# Patient Record
Sex: FEMALE | URBAN - METROPOLITAN AREA
[De-identification: names, ages, dates, MRNs, and addresses within clinical notes are randomized per-mention and may not be internally consistent; named-entity substitution may affect disease eponyms.]

---

## 2017-11-15 ENCOUNTER — APPOINTMENT (RX ONLY)
Dept: URBAN - METROPOLITAN AREA CLINIC 17 | Facility: CLINIC | Age: 47
Setting detail: DERMATOLOGY
End: 2017-11-15

## 2017-11-15 DIAGNOSIS — L71.8 OTHER ROSACEA: ICD-10-CM

## 2017-11-15 PROCEDURE — ? COUNSELING

## 2017-11-15 PROCEDURE — ? PRESCRIPTION

## 2017-11-15 PROCEDURE — 99202 OFFICE O/P NEW SF 15 MIN: CPT

## 2017-11-15 PROCEDURE — ? PRODUCT LINE (PHARMACEUTICALS)

## 2017-11-15 RX ORDER — MINOCYCLINE HYDROCHLORIDE 100 MG/1
CAPSULE ORAL BID
Qty: 60 | Refills: 2 | Status: ERX | COMMUNITY
Start: 2017-11-15

## 2017-11-15 RX ADMIN — MINOCYCLINE HYDROCHLORIDE: 100 CAPSULE ORAL at 23:52

## 2017-11-15 NOTE — PROCEDURE: PRODUCT LINE (PHARMACEUTICALS)
Product 44 Price (In Dollars - Numeric Only, No Special Characters Or $): 0.00
Product 7 Application Directions: Apply affected area QD to BID
Product 19 Units: 0
Name Of Product 12: Imiquimod
Name Of Product 8: Acne Triple Gel
Product 12 Price (In Dollars - Numeric Only, No Special Characters Or $): 50.00
Product 5 Application Directions: Apply to face twice daily with moisturizer
Product 1 Application Directions: Apply affected area QD-BID
Name Of Product 5: Rosacea triple gel
Name Of Product 13: Tazarotene 0.05%
Product 6 Application Directions: Apply to face nightly
Name Of Product 6: Tretinoin 0.1% cream
Name Of Product 11: Tretinoin 0.025%
Name Of Product 7: Seborrheic Dermatitis Cream
Name Of Product 2: Clobetasol Solution
Detail Level: Zone
Name Of Product 10: Actinic keratosis gel
Name Of Product 4: Melasma Emulsion
Render Product Pricing In Note: Yes
Product 13 Application Directions: Apply 1-2 pumps 1 time weekly at night, increase daily as tolerated
Product 3 Application Directions: Wash affected area of scalp once daily
Name Of Product 3: Clobetasol Shampoo
Name Of Product 9: Tretinoin 0.05%
Product 7 Price (In Dollars - Numeric Only, No Special Characters Or $): 40.00
Product 4 Application Directions: Apply to areas of dark pigmentation once to twice daily
Product 12 Application Directions: Apply to affected area once a day x 2 weeks, stop treating for 2 weeks then retreat for 2 weeks
Product 5 Units: 1
Product 9 Application Directions: Apply to face QHS

## 2017-11-27 ENCOUNTER — RX ONLY (OUTPATIENT)
Age: 47
Setting detail: RX ONLY
End: 2017-11-27

## 2017-11-27 RX ORDER — DOXYCYCLINE 100 MG/1
1 TABLET, FILM COATED ORAL BID
Qty: 60 | Refills: 3 | Status: ERX | COMMUNITY
Start: 2017-11-27

## 2017-12-14 ENCOUNTER — RX ONLY (OUTPATIENT)
Age: 47
Setting detail: RX ONLY
End: 2017-12-14

## 2017-12-14 RX ORDER — FLUCONAZOLE 150 MG/1
1 TABLET ORAL
Qty: 2 | Refills: 0 | Status: ERX | COMMUNITY
Start: 2017-12-14

## 2018-01-24 ENCOUNTER — APPOINTMENT (RX ONLY)
Dept: URBAN - METROPOLITAN AREA CLINIC 17 | Facility: CLINIC | Age: 48
Setting detail: DERMATOLOGY
End: 2018-01-24

## 2018-01-24 DIAGNOSIS — L71.8 OTHER ROSACEA: ICD-10-CM

## 2018-01-24 DIAGNOSIS — L50.3 DERMATOGRAPHIC URTICARIA: ICD-10-CM

## 2018-01-24 PROCEDURE — ? PRODUCT LINE (PHARMACEUTICALS)

## 2018-01-24 PROCEDURE — 99213 OFFICE O/P EST LOW 20 MIN: CPT

## 2018-01-24 PROCEDURE — ? OTHER

## 2018-01-24 PROCEDURE — ? PRESCRIPTION

## 2018-01-24 PROCEDURE — ? COUNSELING

## 2018-01-24 PROCEDURE — ? TREATMENT REGIMEN

## 2018-01-24 RX ORDER — SODIUM SULFACETAMIDE AND SULFUR 80; 40 MG/ML; MG/ML
1 LOTION TOPICAL QD
Qty: 1 | Refills: 11 | Status: ERX | COMMUNITY
Start: 2018-01-24

## 2018-01-24 RX ORDER — DOXYCYCLINE HYCLATE 150 MG/1
1 TABLET, DELAYED RELEASE ORAL QD
Qty: 30 | Refills: 11 | Status: ERX | COMMUNITY
Start: 2018-01-24

## 2018-01-24 RX ADMIN — DOXYCYCLINE HYCLATE 1: 150 TABLET, DELAYED RELEASE ORAL at 23:20

## 2018-01-24 RX ADMIN — SODIUM SULFACETAMIDE AND SULFUR 1: 80; 40 LOTION TOPICAL at 23:21

## 2018-01-24 ASSESSMENT — LOCATION DETAILED DESCRIPTION DERM
LOCATION DETAILED: LEFT CLAVICULAR SKIN
LOCATION DETAILED: LEFT INFERIOR CENTRAL MALAR CHEEK

## 2018-01-24 ASSESSMENT — LOCATION SIMPLE DESCRIPTION DERM
LOCATION SIMPLE: LEFT CLAVICULAR SKIN
LOCATION SIMPLE: LEFT CHEEK

## 2018-01-24 ASSESSMENT — LOCATION ZONE DERM
LOCATION ZONE: TRUNK
LOCATION ZONE: FACE

## 2018-01-24 NOTE — PROCEDURE: OTHER
Note Text (......Xxx Chief Complaint.): This diagnosis correlates with the
Detail Level: Simple
Other (Free Text): Will consider laser treatment in the future, but needs to get her Rosacea under good control first

## 2018-01-24 NOTE — PROCEDURE: PRODUCT LINE (PHARMACEUTICALS)
Product 13 Application Directions: Apply 1-2 pumps 1 time weekly at night, increase daily as tolerated
Product 16 Units: 0
Product 24 Price (In Dollars - Numeric Only, No Special Characters Or $): 0.00
Product 5 Application Directions: Apply to face twice daily with moisturizer
Name Of Product 13: Tazarotene 0.05%
Name Of Product 6: Tretinoin 0.1% cream
Name Of Product 2: Clobetasol Solution
Product 7 Application Directions: Apply affected area QD to BID
Product 9 Price (In Dollars - Numeric Only, No Special Characters Or $): 50.00
Name Of Product 12: Imiquimod
Product 12 Application Directions: Apply to affected area once a day x 2 weeks, stop treating for 2 weeks then retreat for 2 weeks
Name Of Product 7: Seborrheic Dermatitis Cream
Send Charges To Patient Encounter: Yes
Product 3 Application Directions: Wash affected area of scalp once daily
Product 7 Price (In Dollars - Numeric Only, No Special Characters Or $): 40.00
Product 1 Application Directions: Apply affected area QD-BID
Name Of Product 5: Rosacea triple gel
Name Of Product 10: Actinic keratosis gel
Product 4 Application Directions: Apply to areas of dark pigmentation once to twice daily
Name Of Product 1: Melasma emulsion
Name Of Product 11: Tretinoin 0.025%
Product 6 Application Directions: Apply to face nightly
Name Of Product 3: Clobetasol Shampoo
Name Of Product 8: Acne Triple Gel
Detail Level: Zone
Product 5 Units: 1
Name Of Product 9: Tretinoin 0.05%
Product 9 Application Directions: Apply to face QHS

## 2018-04-25 ENCOUNTER — APPOINTMENT (RX ONLY)
Dept: URBAN - METROPOLITAN AREA CLINIC 17 | Facility: CLINIC | Age: 48
Setting detail: DERMATOLOGY
End: 2018-04-25

## 2018-04-25 DIAGNOSIS — L71.8 OTHER ROSACEA: ICD-10-CM | Status: WELL CONTROLLED

## 2018-04-25 DIAGNOSIS — Z71.89 OTHER SPECIFIED COUNSELING: ICD-10-CM

## 2018-04-25 PROCEDURE — ? TREATMENT REGIMEN

## 2018-04-25 PROCEDURE — ? COUNSELING

## 2018-04-25 PROCEDURE — 99213 OFFICE O/P EST LOW 20 MIN: CPT

## 2018-04-25 PROCEDURE — ? OTHER

## 2018-04-25 PROCEDURE — ? PRODUCT LINE (PHARMACEUTICALS)

## 2018-04-25 PROCEDURE — ? PRESCRIPTION

## 2018-04-25 RX ORDER — BRIMONIDINE TARTRATE 5 MG/G
GEL TOPICAL PRN
Qty: 1 | Refills: 5 | Status: ERX | COMMUNITY
Start: 2018-04-25

## 2018-04-25 RX ADMIN — BRIMONIDINE TARTRATE: 5 GEL TOPICAL at 22:37

## 2018-04-25 ASSESSMENT — LOCATION DETAILED DESCRIPTION DERM: LOCATION DETAILED: LEFT INFERIOR CENTRAL MALAR CHEEK

## 2018-04-25 ASSESSMENT — LOCATION SIMPLE DESCRIPTION DERM: LOCATION SIMPLE: LEFT CHEEK

## 2018-04-25 ASSESSMENT — LOCATION ZONE DERM: LOCATION ZONE: FACE

## 2018-04-25 NOTE — PROCEDURE: PRODUCT LINE (PHARMACEUTICALS)
Product 6 Price (In Dollars - Numeric Only, No Special Characters Or $): 50.00
Product 9 Application Directions: Apply to face QHS
Product 31 Units: 0
Name Of Product 12: Imiquimod
Product 22 Price (In Dollars - Numeric Only, No Special Characters Or $): 0.00
Name Of Product 11: Tretinoin 0.025%
Name Of Product 10: Actinic keratosis gel
Name Of Product 3: Clobetasol Shampoo
Name Of Product 1: Tacrolimus ointment
Product 5 Units: 1
Product 12 Application Directions: Apply to affected area once a day x 2 weeks, stop treating for 2 weeks then retreat for 2 weeks
Product 2 Application Directions: Apply affected area QD-BID
Product 3 Price (In Dollars - Numeric Only, No Special Characters Or $): 40.00
Product 13 Application Directions: Apply 1-2 pumps 1 time weekly at night, increase daily as tolerated
Detail Level: Zone
Product 1 Application Directions: Apply affected area QHS
Product 3 Application Directions: Wash affected area of scalp once daily
Product 5 Application Directions: Apply to face twice daily with moisturizer
Name Of Product 6: Tretinoin 0.1% cream
Name Of Product 13: Tazarotene 0.05%
Product 8 Application Directions: Apply affected area QD to BID
Name Of Product 4: Melasma Emulsion
Product 4 Application Directions: Apply to areas of dark pigmentation once to twice daily
Product 6 Application Directions: Apply to face nightly
Name Of Product 8: Acne Triple Gel
Send Charges To Patient Encounter: Yes
Name Of Product 7: Seborrheic Dermatitis Cream
Name Of Product 5: Rosacea triple gel
Name Of Product 9: Tretinoin 0.05%

## 2018-04-25 NOTE — PROCEDURE: OTHER
Note Text (......Xxx Chief Complaint.): This diagnosis correlates with the
Detail Level: Zone
Other (Free Text): Recommend sun screen SPF 30+ QD. Samples of [place] MD sun screen given to patient. Patient instructed to apply small amount to test spot initially.

## 2018-04-25 NOTE — PROCEDURE: TREATMENT REGIMEN
Detail Level: Simple
Continue Regimen: Doxycycline 100mg BID, SulfaCleanse, Rosacea Triple Gel. RTC in 6 months for re-evaluation.

## 2018-11-29 ENCOUNTER — APPOINTMENT (RX ONLY)
Dept: URBAN - METROPOLITAN AREA CLINIC 17 | Facility: CLINIC | Age: 48
Setting detail: DERMATOLOGY
End: 2018-11-29

## 2018-11-29 DIAGNOSIS — L71.8 OTHER ROSACEA: ICD-10-CM

## 2018-11-29 PROCEDURE — ? PRESCRIPTION

## 2018-11-29 PROCEDURE — ? OTHER

## 2018-11-29 PROCEDURE — 99213 OFFICE O/P EST LOW 20 MIN: CPT

## 2018-11-29 PROCEDURE — ? COUNSELING

## 2018-11-29 RX ORDER — DOXYCYCLINE HYCLATE 150 MG/1
TABLET, DELAYED RELEASE ORAL QD
Qty: 60 | Refills: 11 | Status: ERX

## 2018-11-29 RX ORDER — SODIUM SULFACETAMIDE AND SULFUR 80; 40 MG/ML; MG/ML
LOTION TOPICAL QD
Qty: 1 | Refills: 11 | Status: ERX

## 2018-11-29 ASSESSMENT — LOCATION ZONE DERM: LOCATION ZONE: FACE

## 2018-11-29 ASSESSMENT — LOCATION DETAILED DESCRIPTION DERM: LOCATION DETAILED: LEFT MEDIAL MALAR CHEEK

## 2018-11-29 ASSESSMENT — LOCATION SIMPLE DESCRIPTION DERM: LOCATION SIMPLE: LEFT CHEEK

## 2018-11-29 NOTE — PROCEDURE: OTHER
Detail Level: Zone
Note Text (......Xxx Chief Complaint.): This diagnosis correlates with the
Other (Free Text): Discussed patient can fill her rosacea medications up to a year and can call if she needs a refill. Patient was put on a waiting list for rosacea triple gel.